# Patient Record
Sex: FEMALE | Race: WHITE | URBAN - METROPOLITAN AREA
[De-identification: names, ages, dates, MRNs, and addresses within clinical notes are randomized per-mention and may not be internally consistent; named-entity substitution may affect disease eponyms.]

---

## 2023-01-24 ENCOUNTER — OFFICE VISIT (OUTPATIENT)
Dept: URBAN - METROPOLITAN AREA CLINIC 54 | Facility: CLINIC | Age: 73
End: 2023-01-24
Payer: MEDICARE

## 2023-01-24 DIAGNOSIS — H35.3231 EXUDATIVE AGE-REL MCLR DEGN, BI, WITH ACTV CHRDL NEOVAS: Primary | ICD-10-CM

## 2023-01-24 DIAGNOSIS — Z96.1 PRESENCE OF PSEUDOPHAKIA: ICD-10-CM

## 2023-01-24 DIAGNOSIS — H43.813 BILATERAL VITREOUS DETACHMENT OF EYES: ICD-10-CM

## 2023-01-24 PROCEDURE — 67028 INJECTION EYE DRUG: CPT | Performed by: OPHTHALMOLOGY

## 2023-01-24 PROCEDURE — 92134 CPTRZ OPH DX IMG PST SGM RTA: CPT | Performed by: OPHTHALMOLOGY

## 2023-01-24 PROCEDURE — 99204 OFFICE O/P NEW MOD 45 MIN: CPT | Performed by: OPHTHALMOLOGY

## 2023-01-24 ASSESSMENT — INTRAOCULAR PRESSURE
OS: 18
OD: 11

## 2023-01-24 NOTE — IMPRESSION/PLAN
Impression: Exudative age-rel mclr degn, bi, with actv chrdl neovas: H35.3231. Bilateral. Plan: Exam/OCT reveals SRF OD and questionable SRF OS. Patient has new NV AMD OD. Recommend starting treatment with anti-VEGF therapy OD and observation OS. Patient agrees. Avastin administered OD Return in 4  weeks, OCT OU, Avastin OS

## 2023-02-21 ENCOUNTER — PROCEDURE (OUTPATIENT)
Dept: URBAN - METROPOLITAN AREA CLINIC 54 | Facility: CLINIC | Age: 73
End: 2023-02-21
Payer: MEDICARE

## 2023-02-21 DIAGNOSIS — H35.3231 EXUDATIVE AGE-RELATED MACULAR DEGENERATION, BILATERAL, WITH ACTIVE CHOROIDAL NEOVASCULARIZATION: Primary | ICD-10-CM

## 2023-02-21 PROCEDURE — 67028 INJECTION EYE DRUG: CPT | Performed by: OPHTHALMOLOGY

## 2023-02-21 PROCEDURE — 92134 CPTRZ OPH DX IMG PST SGM RTA: CPT | Performed by: OPHTHALMOLOGY

## 2023-02-21 ASSESSMENT — INTRAOCULAR PRESSURE
OD: 21
OS: 27

## 2023-02-22 NOTE — IMPRESSION/PLAN
Impression: Exudative age-rel mclr degn, bi, with actv chrdl neovas: H35.3231. Bilateral. Plan: Returns following 1st Avastin injection OD on 1/24/23 (diagnosed with new-onset NV AMD OD on 1/24/23). Exam/OCT reveals decreased SRF OD and recurrent SRF OS. Recommend repeat treatment OU. Patient wishes to treat OS only today and will follow up with Dr. Humphrey Irby for further treatment OD. Avastin administered OS Return PRN, OCT OU, RE-EVAL Avastin OU